# Patient Record
(demographics unavailable — no encounter records)

---

## 2024-10-07 NOTE — DISCUSSION/SUMMARY
[de-identified] : I went over the pathophysiology of the patient's symptoms in great detail with the patient. I discussed the underlying pathophysiology of the patient's condition in great detail with the patient. I went over the patient's x-rays with them in great detail. We discussed the use of ice, Tylenol and anti-inflammatories to relieve pain. She should avoid twisting and rotating right now. The patient should continue to take deep breathes.   All of their questions were answered. They understand and consent to the plan.   FU in 4-6 weeks.

## 2024-10-07 NOTE — HISTORY OF PRESENT ILLNESS
[de-identified] : 63 year old RHD female presents for follow-up evaluation of right-sided rib pain s/p fall while playing pickleball 2 weeks ago. Patient reports "sharp, stabbing" right-sided rib painb, rated 10/10 while breathing, sneezing, coughing, and with generalized movement. Patient admits to applying ice and taking Advil as needed, which provided some pain relief. Patient otherwise states that she has been in her usual state of health. Patient denies any fever, chills, headache, dizziness, shortness of breath, chest pain, palpitations, abdominal pain, new onset urinary/bowel incontinence, saddle paresthesia, or other acute complaints at this time.  Radiology Results 9/8/2022: o Cervical Spine : AP, lateral, and two oblique views were obtained and revealed grade 1 retrolisthesis of C5 on C6. Mild foraminal stenosis at C5/C6. No lytic lesions.

## 2024-10-07 NOTE — PHYSICAL EXAM
[de-identified] : Constitutional o Appearance : well-nourished, well developed, alert, in no acute distress  Head and Face o Head :  Inspection : atraumatic, normocephalic o Face :  Inspection : no visible rash or discoloration Respiratory o Respiratory Effort: breathing unlabored  Neurologic o Sensation : Normal sensation  Psychiatric o Mood and Affect: mood normal, affect appropriate  Lymphatic o Additional Nodes : No palpable lymph nodes present   Cervical Spine o Inspection/Palpation :  Inspection : alignment midline, normal degree of lordosis present  Skin : normal appearance, no masses or tenderness, trachea midline  Palpation : no left parascapular tenderness, mild left paracervical tenderness  o Range of Motion : extension causes left paracervical discomfort, slight limitation of rotation to the left o Tests: negative Spurling's test  o Sensation : normal sensation to light touch  o Upper Extremity Strength : elbow flexion and extension 5/5; wrist extension, flexion, ulnar deviation and radial deviation 5/5; all intrinsic and extrinsic hand muscles 5/5   Right Upper Extremity o Right Shoulder :  Inspection/Palpation : no tenderness, no swelling or deformities  Range of Motion : full and painless in all planes, no crepitance  Strength : forward elevation 5/5, internal rotation 5/5, external rotation 5/5, external rotation at 90 of abduction 5/5, supraspinatus 5/5, adduction and abduction 5/5, biceps/triceps 5/5   Stability : no joint instability on provocative testing   Tests: Soler negative, Neer test negative, Angela test negative, drop arm test negative  Right ribs:   Inspection/Palpation : 10th rib tenderness, tenderness intercostal space between the 9th and 10th ribs swelling or deformities, no ecchymosis,  tenderness between the 5th and 6th intercostal space,  Range of motion: side bending causes discomfort   Left Upper Extremity o Left Shoulder :  Inspection/Palpation : no tenderness, no swelling or deformities  Range of Motion : full and painless in all planes, no crepitance, no pain with cross chest maneuvers   Strength : forward elevation 5/5, internal rotation 5/5, external rotation 5/5, external rotation at 90 of abduction 5/5, supraspinatus 5/5, adduction and abduction 5/5, biceps/triceps 5/5   Stability : no joint instability on provocative testing  Tests: Soler negative, Neer test negative, Angela test negative, drop arm test negative  Gait and Station: Gait: gait normal, no significant extremity swelling or lymphedema, good proprioception and balance  Radiology Results 10/7/2024 o Right Ribs : 2 views were obtained and revealed no evident fracture and hardware in good position

## 2024-10-07 NOTE — ADDENDUM
[FreeTextEntry1] : I, TEAGAN ROTHMAN, acted solely as a scribe for Dr. Dragan Rodarte on this date 10/07/2024.  All medical record entries made by the Scribe were at my, Dr. Dragan Rodarte, direction and personally dictated by me on 10/07/2024. I have reviewed the chart and agree that the record accurately reflects my personal performance of the history, physical exam, assessment and plan. I have also personally directed, reviewed, and agreed with the chart.